# Patient Record
Sex: FEMALE | Race: WHITE | NOT HISPANIC OR LATINO | Employment: UNEMPLOYED | URBAN - METROPOLITAN AREA
[De-identification: names, ages, dates, MRNs, and addresses within clinical notes are randomized per-mention and may not be internally consistent; named-entity substitution may affect disease eponyms.]

---

## 2018-12-05 ENCOUNTER — TELEPHONE (OUTPATIENT)
Dept: MEDICAL GROUP | Facility: PHYSICIAN GROUP | Age: 23
End: 2018-12-05

## 2018-12-06 ENCOUNTER — OFFICE VISIT (OUTPATIENT)
Dept: MEDICAL GROUP | Facility: PHYSICIAN GROUP | Age: 23
End: 2018-12-06
Payer: COMMERCIAL

## 2018-12-06 VITALS
OXYGEN SATURATION: 99 % | SYSTOLIC BLOOD PRESSURE: 100 MMHG | HEART RATE: 82 BPM | BODY MASS INDEX: 21.03 KG/M2 | TEMPERATURE: 98.5 F | HEIGHT: 67 IN | DIASTOLIC BLOOD PRESSURE: 72 MMHG | WEIGHT: 134 LBS

## 2018-12-06 DIAGNOSIS — D22.9 ATYPICAL NEVUS: ICD-10-CM

## 2018-12-06 DIAGNOSIS — Z00.00 ANNUAL PHYSICAL EXAM: ICD-10-CM

## 2018-12-06 PROCEDURE — 99385 PREV VISIT NEW AGE 18-39: CPT | Performed by: PHYSICIAN ASSISTANT

## 2018-12-06 PROCEDURE — 99213 OFFICE O/P EST LOW 20 MIN: CPT | Mod: 25 | Performed by: PHYSICIAN ASSISTANT

## 2018-12-06 ASSESSMENT — PATIENT HEALTH QUESTIONNAIRE - PHQ9: CLINICAL INTERPRETATION OF PHQ2 SCORE: 0

## 2018-12-06 NOTE — TELEPHONE ENCOUNTER
Future Appointments       Provider Department Center    12/6/2018 1:45 PM Kamala Hernandez P.A.-C. [facility]        NEW PATIENT VISIT PRE-VISIT PLANNING    1.  EpicCare Patient is checked in Patient Demographics? YES    2.  Immunizations were updated in Epic using WebIZ?: No WebIZ record       •  Web Iz Recommendations: FLU, HPV, TDAP and VARICELLA (Chicken Pox)     3.  Is this appointment scheduled as a Hospital Follow-Up? No    4.  Patient is due for the following Health Maintenance Topics:   Health Maintenance Due   Topic Date Due   • IMM HPV VACCINE (1 of 3 - Female 3-dose series) 08/29/2007   • IMM VARICELLA (CHICKENPOX) VACCINE (1 of 2 - 2-dose adolescent series) 08/29/2009   • CHLAMYDIA SCREENING  08/29/2012   • IMM DTaP/Tdap/Td Vaccine (1 - Tdap) 08/29/2015   • PAP SMEAR  08/29/2017   • IMM INFLUENZA (1) 09/01/2018       5.  Reviewed/Updated the following with patient:   •   Preferred Pharmacy? NO       •   Preferred Lab? NO       •   Preferred Communication? NO       •   Allergies? NO       •   Medications? NO       •   Social History? NO       •   Family History (document living status of immediate family members and if + hx of cancer, diabetes, hypertension, hyperlipidemia, heart attack, stroke) NO    6.  Updated Care Team?       •   DME Company (gait device, O2, CPAP, etc.) NO       •   Other Specialists (eye doctor, derm, GYN, cardiology, endo, etc): NO    7.  MDX printed for Provider? NO    8.  Patient was informed to arrive 15 min prior to their   scheduled appointment and bring in their medication bottles. KEYANA

## 2018-12-07 NOTE — PROGRESS NOTES
Chief Complaint: Annual    Ele Arguelles is a 23 y.o. female who presents for annual exam. Is a new patient to me and [facility] and is also establishing care today.    Previous PCP: None    Pt has GYN provider: no  Last pap smear: never  Gardasil: yes   Last Td: unknown  Regular exercise: yes     Patient presents to the office today to establish care with me.  She recently moved to the area from [place].  She states that she has not had a primary care doctor in many years.  She denies any known current medical problems or history of any serious medical problems.  She is not currently taking any medications.  She would like me to check her moles today.  She states that she has always had multiple and does not get a chance to check her back much. Skin cancer is on her mind, as she does have a family history.  Has an aunt that has had melanoma and sister with basal cell carcinoma.    She  has no past medical history on file.  She  has a past surgical history that includes dental extraction(s).  No current outpatient prescriptions on file.     No current facility-administered medications for this visit.      Social History   Substance Use Topics   • Smoking status: Never Smoker   • Smokeless tobacco: Never Used   • Alcohol use No       Review Of Systems  Skin: +multiple moles - would like me to check today.   Eyes: negative for visual blurring, double vision, eye pain  Ears/Nose/Throat: negative for oral or dental problem, frequent URI's, sinus trouble, persistent sore throat  Respiratory: negative for dyspnea  Cardiovascular: negative for chest pain   Gastrointestinal: negative for nausea, heartburn, abdominal pain, constipation or diarrhea, black stool or bloody stool  Genitourinary: negative for recurrent UTI  Musculoskeletal: negative for joint swelling and muscle pain/ soreness  Neurologic: negative for new or changing headaches  Psychiatric: negative for anxiety, depression  Hematologic/Lymphatic/Immunologic:  "negative for swollen glands  Endocrine: negative for polyuria.       PHYSICAL EXAMINATION:  Blood pressure 100/72, pulse 82, temperature 36.9 °C (98.5 °F), height 1.702 m (5' 7\"), weight 60.8 kg (134 lb), last menstrual period 11/24/2018, SpO2 99 %.  Body mass index is 20.99 kg/m².  Wt Readings from Last 4 Encounters:   12/06/18 60.8 kg (134 lb)       Constitutional: Alert, well-appearing, no distress.  Skin: Warm, dry, good turgor, no rashes in visible areas.  On exam of the patient's back, she does have 2 atypical-appearing skin lesions.  There is one on the left mid back just above the bra line.  Size is 6 x 4 mm.  Color is pinkish-red. Slightly irregular border.  There is another lesion on the middle of the mid back.  Size is 4 x 5 mm.  Color is brown centrally with red on the periphery.  Slightly irregular border.  Eye: pupils equal, round and reactive to light, conjunctivae clear, lids normal.  ENMT: Lips without lesions, good dentition, oropharynx clear. Pinnae skin normal with no lesions. TM pearly gray with normal light reflex.   Neck: supple, no masses. No submandibular, anterior cervical adenopathy. No thyromegaly.  Respiratory: Unlabored respiratory effort, lungs clear to auscultation, no wheezes, no ronchi.  Cardiovascular: Normal S1, S2, no murmur, no peripheral edema.  Abdomen: Abdomen Soft, non-tender, no masses, no hepatosplenomegaly.   Psych: Alert and oriented x3, normal affect and mood.  Musc/Skel: Normal motion UE and LE proximal and distal.        ASSESSMENT/PLAN:    1. Annual physical exam  Well-female with normal exam today other than skin findings as noted below.  HCM:  Due for pap smear. Advised to schedule well-woman exam with me. Due for Tdap/influenza vaccines which she currently declines.  Labs not currently indicated  Pt counseled about skin care, diet, and exercise.    2. Atypical nevus  New problem, unclear level of stability given that patient has not been examining her back.  I " have made note of the 2 most suspicious lesions including their size and current features.  Instructed patient to regularly monitor them over the next few months for changes.  Will reassess at her well woman exam in 3 months. Increase in size/other change warrants biopsy.      Return in about 3 months (around 3/6/2019) for well-woman; Short.    Kamala Hernandez P.A.-C.

## 2021-04-02 NOTE — LETTER
December 6, 2018         Patient: lEe Arguelles   YOB: 1995   Date of Visit: 12/6/2018           To Whom it May Concern:    Ele Arguelles was seen in my clinic on 12/6/2018 for an annual physical exam.    If you have any questions or concerns, please don't hesitate to call.        Sincerely,           Kamala Hernandez P.A.-C.  Electronically Signed      04-01 Na 137 mmol/L Glu 92 mg/dL K+ 3.5 mmol/L Cr 1.07 mg/dL BUN 29 mg/dL<H> Phos n/a